# Patient Record
Sex: MALE | Race: WHITE | Employment: FULL TIME | URBAN - METROPOLITAN AREA
[De-identification: names, ages, dates, MRNs, and addresses within clinical notes are randomized per-mention and may not be internally consistent; named-entity substitution may affect disease eponyms.]

---

## 2025-01-02 ENCOUNTER — OFFICE VISIT (OUTPATIENT)
Dept: ENDOCRINOLOGY | Age: 55
End: 2025-01-02

## 2025-01-02 VITALS
HEART RATE: 98 BPM | WEIGHT: 195 LBS | HEIGHT: 66 IN | BODY MASS INDEX: 31.34 KG/M2 | OXYGEN SATURATION: 96 % | SYSTOLIC BLOOD PRESSURE: 138 MMHG | DIASTOLIC BLOOD PRESSURE: 92 MMHG

## 2025-01-02 DIAGNOSIS — E66.811 CLASS 1 OBESITY WITHOUT SERIOUS COMORBIDITY IN ADULT, UNSPECIFIED BMI, UNSPECIFIED OBESITY TYPE: Primary | ICD-10-CM

## 2025-01-02 RX ORDER — ATORVASTATIN CALCIUM 10 MG/1
10 TABLET, FILM COATED ORAL DAILY
COMMUNITY
Start: 2024-09-10

## 2025-01-02 RX ORDER — TIRZEPATIDE 2.5 MG/.5ML
2.5 INJECTION, SOLUTION SUBCUTANEOUS WEEKLY
Qty: 2 ML | Refills: 5 | Status: SHIPPED | OUTPATIENT
Start: 2025-01-02

## 2025-01-02 RX ORDER — EZETIMIBE 10 MG/1
10 TABLET ORAL DAILY
COMMUNITY
Start: 2024-12-10

## 2025-01-02 ASSESSMENT — ENCOUNTER SYMPTOMS
SINUS PRESSURE: 0
COUGH: 0
VOMITING: 0
DIARRHEA: 0
ABDOMINAL PAIN: 0
NAUSEA: 0
SORE THROAT: 0
WHEEZING: 0
SHORTNESS OF BREATH: 0
RHINORRHEA: 0

## 2025-01-02 NOTE — PATIENT INSTRUCTIONS
Start Zepbound 2.5 mg injected weekly   Repeat labs one week before your next appointment  Follow up in 3 months   Glucagon-Like Peptide-1 (GLP1) Receptor Agonist    You have been prescribed a new medication called  Zepbound     Why am I taking it?   Diabetes   Increases your own insulin release from the pancreas.  Lowers your blood sugar for people with diabetes.  Prevents the liver from releasing too much glucose.  Increases insulin release when glucose is high.  Weight loss   In addition to lifestyle changes, including diet and exercise, this medication can assist with weight loss.  Slows down digestion of food, which causes you to feel full longer after eating.  Cardiovascular  Lowers the chances of having a heart attack, stroke, and death in patients with and without diabetes.    How do I take it?   Trulicity, Ozempic, Wegovy, and Mounjaro  Once WEEKLY subcutaneous injection via prefilled syringe.  Doses will start low and will increase as needed every 4-8 weeks as tolerable.  Administer in the abdomen, thigh or upper arm on the same day each week.  Rotate injection sites for each new injection.  Unused prefilled syringe can be stored in the refrigerator. Do not freeze.  When you are ready to use the prefilled syringe, remove from the refrigerator and leave at room temperature for ~20 minutes prior to injecting.  Saxenda  Once DAILY subcutaneous injection via prefilled syringe.  Doses will start low and will increase as needed every 4-8 weeks as tolerable.  Administer in the abdomen, thigh or upper arm on the same day each week.  Rotate injection sites for each new injection.  Unused prefilled syringe can be stored in the refrigerator. Do not freeze.  When you are ready to use the prefilled syringe, remove from the refrigerator and leave at room temperature for ~20 minutes prior to injecting.  Rybelsus  Once daily tablet taken by mouth.  Take with a full glass of water at least 30 minutes before the first meal

## 2025-01-02 NOTE — PROGRESS NOTES
1/2/2025    Assessment:       Diagnosis Orders   1. Class 1 obesity without serious comorbidity in adult, unspecified BMI, unspecified obesity type  Comprehensive Metabolic Panel        PLAN:     Start Zepbound 2.5 mg injected weekly   Repeat labs one week before your next appointment  Follow up in 3 months     Orders Placed This Encounter   Procedures    Comprehensive Metabolic Panel     Standing Status:   Future     Standing Expiration Date:   1/2/2026     Orders Placed This Encounter   Medications    tirzepatide-weight management (ZEPBOUND) 2.5 MG/0.5ML SOAJ subCUTAneous auto-injector pen     Sig: Inject 2.5 mg into the skin once a week     Dispense:  2 mL     Refill:  5     Return in about 3 months (around 4/2/2025) for Diabetes.  Subjective:     Chief Complaint   Patient presents with    Obesity    New Patient     Vitals:    01/02/25 1304 01/02/25 1306   BP: (!) 136/9 (!) 138/92   Pulse: 98    SpO2: 96%    Weight: 88.5 kg (195 lb)    Height: 1.676 m (5' 6\")      Wt Readings from Last 3 Encounters:   01/02/25 88.5 kg (195 lb)     BP Readings from Last 3 Encounters:   01/02/25 (!) 138/92     Sukumar is a 54-year-old male presents today for weight management.  [facility], is participating in their weight loss program and would like to consider going onto medications to help to meet his weight loss and fitness goals.  He has cut his calories to less than 2000 a day, has eliminated day-to-day sugar intake, and reduced his carb intake.  He does not have an exercise program in place, I encouraged him to start a fitness program at least 3 to 4 days a week of aerobic activity.  He has no history of pancreatitis, or heavy alcohol use.  He does have elevated liver enzymes per the patient and is being followed by a provider at [facility].  I am going to prescribe him Zepbound injected weekly, risks and benefits were discussed with the patient and given to him in writing.      No past medical history on file.  No

## 2025-01-06 ENCOUNTER — TELEPHONE (OUTPATIENT)
Dept: ENDOCRINOLOGY | Age: 55
End: 2025-01-06